# Patient Record
Sex: FEMALE | Race: ASIAN | NOT HISPANIC OR LATINO | Employment: FULL TIME | URBAN - METROPOLITAN AREA
[De-identification: names, ages, dates, MRNs, and addresses within clinical notes are randomized per-mention and may not be internally consistent; named-entity substitution may affect disease eponyms.]

---

## 2024-02-12 DIAGNOSIS — Z79.631 LONG TERM (CURRENT) USE OF ANTIMETABOLITE AGENT: ICD-10-CM

## 2024-02-12 DIAGNOSIS — M05.9 RHEUMATOID ARTHRITIS WITH RHEUMATOID FACTOR, UNSPECIFIED (MULTI): Primary | ICD-10-CM

## 2024-03-01 ENCOUNTER — APPOINTMENT (OUTPATIENT)
Dept: RHEUMATOLOGY | Facility: CLINIC | Age: 64
End: 2024-03-01
Payer: COMMERCIAL

## 2024-03-06 ENCOUNTER — LAB (OUTPATIENT)
Dept: LAB | Facility: LAB | Age: 64
End: 2024-03-06
Payer: COMMERCIAL

## 2024-03-06 ENCOUNTER — OFFICE VISIT (OUTPATIENT)
Dept: RHEUMATOLOGY | Facility: CLINIC | Age: 64
End: 2024-03-06
Payer: COMMERCIAL

## 2024-03-06 VITALS — DIASTOLIC BLOOD PRESSURE: 51 MMHG | HEART RATE: 77 BPM | SYSTOLIC BLOOD PRESSURE: 93 MMHG

## 2024-03-06 DIAGNOSIS — M81.0 OSTEOPOROSIS, UNSPECIFIED OSTEOPOROSIS TYPE, UNSPECIFIED PATHOLOGICAL FRACTURE PRESENCE: Primary | ICD-10-CM

## 2024-03-06 DIAGNOSIS — M05.79 RHEUMATOID ARTHRITIS INVOLVING MULTIPLE SITES WITH POSITIVE RHEUMATOID FACTOR (MULTI): ICD-10-CM

## 2024-03-06 DIAGNOSIS — M81.0 OSTEOPOROSIS, UNSPECIFIED OSTEOPOROSIS TYPE, UNSPECIFIED PATHOLOGICAL FRACTURE PRESENCE: ICD-10-CM

## 2024-03-06 LAB
ALBUMIN SERPL BCP-MCNC: 4.6 G/DL (ref 3.4–5)
ALP SERPL-CCNC: 56 U/L (ref 33–136)
ALT SERPL W P-5'-P-CCNC: 18 U/L (ref 7–45)
ANION GAP SERPL CALC-SCNC: 13 MMOL/L (ref 10–20)
AST SERPL W P-5'-P-CCNC: 23 U/L (ref 9–39)
BASOPHILS # BLD AUTO: 0.03 X10*3/UL (ref 0–0.1)
BASOPHILS NFR BLD AUTO: 0.6 %
BILIRUB SERPL-MCNC: 0.5 MG/DL (ref 0–1.2)
BUN SERPL-MCNC: 19 MG/DL (ref 6–23)
CALCIUM SERPL-MCNC: 9.9 MG/DL (ref 8.6–10.6)
CHLORIDE SERPL-SCNC: 106 MMOL/L (ref 98–107)
CO2 SERPL-SCNC: 30 MMOL/L (ref 21–32)
CREAT SERPL-MCNC: 0.69 MG/DL (ref 0.5–1.05)
EGFRCR SERPLBLD CKD-EPI 2021: >90 ML/MIN/1.73M*2
EOSINOPHIL # BLD AUTO: 0.15 X10*3/UL (ref 0–0.7)
EOSINOPHIL NFR BLD AUTO: 2.9 %
ERYTHROCYTE [DISTWIDTH] IN BLOOD BY AUTOMATED COUNT: 14.6 % (ref 11.5–14.5)
GLUCOSE SERPL-MCNC: 92 MG/DL (ref 74–99)
HCT VFR BLD AUTO: 42.6 % (ref 36–46)
HGB BLD-MCNC: 14 G/DL (ref 12–16)
IMM GRANULOCYTES # BLD AUTO: 0.01 X10*3/UL (ref 0–0.7)
IMM GRANULOCYTES NFR BLD AUTO: 0.2 % (ref 0–0.9)
LYMPHOCYTES # BLD AUTO: 1.73 X10*3/UL (ref 1.2–4.8)
LYMPHOCYTES NFR BLD AUTO: 33 %
MCH RBC QN AUTO: 27.6 PG (ref 26–34)
MCHC RBC AUTO-ENTMCNC: 32.9 G/DL (ref 32–36)
MCV RBC AUTO: 84 FL (ref 80–100)
MONOCYTES # BLD AUTO: 0.38 X10*3/UL (ref 0.1–1)
MONOCYTES NFR BLD AUTO: 7.2 %
NEUTROPHILS # BLD AUTO: 2.95 X10*3/UL (ref 1.2–7.7)
NEUTROPHILS NFR BLD AUTO: 56.1 %
NRBC BLD-RTO: 0 /100 WBCS (ref 0–0)
PLATELET # BLD AUTO: 305 X10*3/UL (ref 150–450)
POTASSIUM SERPL-SCNC: 4.5 MMOL/L (ref 3.5–5.3)
PROT SERPL-MCNC: 7.5 G/DL (ref 6.4–8.2)
RBC # BLD AUTO: 5.08 X10*6/UL (ref 4–5.2)
SODIUM SERPL-SCNC: 144 MMOL/L (ref 136–145)
WBC # BLD AUTO: 5.3 X10*3/UL (ref 4.4–11.3)

## 2024-03-06 PROCEDURE — 36415 COLL VENOUS BLD VENIPUNCTURE: CPT

## 2024-03-06 PROCEDURE — 80053 COMPREHEN METABOLIC PANEL: CPT

## 2024-03-06 PROCEDURE — 99205 OFFICE O/P NEW HI 60 MIN: CPT | Performed by: STUDENT IN AN ORGANIZED HEALTH CARE EDUCATION/TRAINING PROGRAM

## 2024-03-06 PROCEDURE — 85025 COMPLETE CBC W/AUTO DIFF WBC: CPT

## 2024-03-06 RX ORDER — METHOTREXATE 2.5 MG/1
10 TABLET ORAL WEEKLY
COMMUNITY
Start: 2023-11-28 | End: 2024-03-06 | Stop reason: WASHOUT

## 2024-03-06 RX ORDER — ALENDRONATE SODIUM 70 MG/1
70 TABLET ORAL WEEKLY
COMMUNITY
Start: 2023-11-06 | End: 2024-03-06 | Stop reason: SDUPTHER

## 2024-03-06 RX ORDER — FOLIC ACID 1 MG/1
1 TABLET ORAL DAILY
Qty: 90 TABLET | Refills: 3 | Status: SHIPPED | OUTPATIENT
Start: 2024-03-06 | End: 2024-03-11 | Stop reason: SDUPTHER

## 2024-03-06 RX ORDER — ALENDRONATE SODIUM 70 MG/1
TABLET ORAL
Qty: 12 TABLET | Refills: 3 | Status: SHIPPED | OUTPATIENT
Start: 2024-03-06

## 2024-03-06 RX ORDER — FOLIC ACID 1 MG/1
1 TABLET ORAL DAILY
COMMUNITY
End: 2024-03-06 | Stop reason: SDUPTHER

## 2024-03-06 NOTE — PROGRESS NOTES
"Subjective   Patient ID: Tiffanie Tenorio \"Kingsley\" is a 63 y.o. female who presents for Rheumatoid Arthritis (NPV to establish care. No physical complaints or concerns.).  HPI: New patient here for RA and osteoporosis      Reviewed 2023 notes from [facility].  Female with a history of seropositive rheumatoid arthritis (positive RF, positive CCP), erosive disease of left fifth MTP in 2011 without reason x-rays, severe osteoporosis with a striae fractures.  Diagnosed with rheumatoid arthritis in 2008.    Current meds: Methotrexate 10 mg taken by drinking liquid once a week with juice, folic acid,    Osteoporosis history: History of alendronate, history of Forteo, history of Tymlos, started Prolia on February 2022 but the cost was too high for additional doses and then patient was switched to Fosamax again in 2022.    Last bone density July 2022    Lumbar spine L1-L4: T-score -1.3 (statistically significant increase)  Left femoral neck: T-score -4, no statistically significant change    Labs: 11/2023  WBC normal, Hgb normal, platelets normal  Creatinine normal, ALP/AST/ALT normal  Albumin normal  Ca high to 10.3      Review of Systems  Constitutional: No fever or chills  Cardiovascular: no chest pain, no palpitations and no syncope.   Respiratory: no cough, no shortness of breath during exertion and no shortness of breath at rest.   Gastrointestinal: no abdominal pain, no nausea and no vomiting.  Neuro: No Headache, no dizziness    Rheumatology specific review of systems  Denies joint pain, morning stiffness, fevers , chills, unintentional weight loss, rashes, alopecia, mouth sores, nasal ulcers, photosensitivity, Raynauds, morning stiffness in back, dry eyes, dry mouth, blood clots, miscarriages, rheum fam hx    Chronic pain specific review of systems  Denies widespread pain , widespread tenderness, brain fog, depression/anxiety, migraines or tension headaches, IBS or heartburn symptoms, irritable or overactive bladder, " "pelvic pain ,TMJ pain,poor sleep, fatigue    Objective   There were no vitals taken for this visit.      Physical Exam  Constitutional: Alert and in no acute distress. Well developed, well nourished  Head and Face: Head and face: Normal.    Cardiovascular: Heart rate and rhythm were normal, normal S1 and S2. No peripheral edema.   Pulmonary: No respiratory distress. Clear bilateral breath sounds.  Musculoskeletal: No synovitis throughout  Skin: Normal skin color and pigmentation, normal skin turgor, and no rash.    Psychiatric: Judgment and insight: Intact. Mood and affect: Normal.     No results found for: \"WBC\", \"HGB\", \"HCT\", \"PLT\", \"ALT\", \"AST\", \"CREATININE\"       No results found for: \"ANANP\", \"ANATITERADD\", \"ANACO\", \"ANAPATTRN\", \"ANPA2\", \"FANAP\", \"ANATITER\", \"ANAT2\", \"GEM\", \"CDCANA\", \"DSDN\", \"EMPSMRNP\", \"SCLN\", \"SCLABQ\", \"SCIB\", \"CTIB\", \"FXEZAA40\", \"WZVGBH02\", \"ASSB\", \"SSBB\", \"C3\", \"C4\", \"UAMICCOMM\", \"UTPCR\", \"ANTIRIBO\", \"ACEN\", \"SEDRATE\", \"NONUHFIRE\", \"POCESR\", \"CRP\", \"RF\", \"CCPIGGQUAL\"\\            There is currently no information documented on the homunculus. Go to the Rheumatology activity and complete the homunculus joint exam.      Electrocardiogram 12 Lead  Normal sinus rhythm  Low voltage QRS  Borderline ECG  No previous ECGs available  Confirmed by MAYRA SIMPSON MD ([id number]) on 5/3/2016 8:05:02 AM          Assessment/Plan:  #Post-menopausal osteoporosis, multiple fractures, severe T-score, patient states she has had a full workup for osteoporosis including normal thyroid, parathyroid, 24-hour calcium in the urine, may be genetic component; she was adopted  -Fosamax: 2017 October to 2019 October (stopped due to leg cramps)  -Forteo started February 14, 2020, changed to Tymlos February 2021 due to high calcium levels, completed early February 2022  -Single dose of Prolia in February 2022, but additional doses were cost prohibitive  -Fosamax started August 2022-1 year refilled March 2024 and pharmacy " consult to reiterate to patient to take once a week on an empty stomach with a cup of water without laying down or eating for 30 minutes  -Will continue to discuss possible side effects with patient; she is tolerating well  -Remains off calcium due to high serum levels  -On a vitamin D supplement  -Patient likely should have anabolic; will see if we can do Evenity.  Repeat bone density should be done at same location July 2024 (if insurance allows), and based off results, we may see if we can trial Evenity  -Could consider genetic evaluation, but would likely not       #Seropositive RF CCP positive erosive RA  -Was in remission from last note that I could find in May 2023 of [facility] rheumatologist  -Remission again today  -On methotrexate 0.4 mL weekly (10 mg) which she drinks with juice, and folic acid daily-1 year of both refilled March 2024    #Preventative Health Recommendations for Primary Care Providers    Vaccine Recommendations:    From ACR 2022 vaccine recommendations:    For Rheumatic and musculoskeletal disease (RMD) patients aged greater than or equal to 65 years, and RMD patients aged >18 and <65 years who are on immunosuppressive medication, giving high-dose or adjuvanted influenza vaccination is conditionally  recommended over giving regular-dose influenza vaccination.    For patients with RMD aged <65 years who are on immunosuppressive medication, pneumococcal vaccination is strongly recommended.    For patients with RMD aged >18 years who are on immunosuppressive medication, administering the recombinant zoster vaccine is strongly recommended.    For patients with RMD aged >26 and <45 years who are on immunosuppressive medication and not previously vaccinated, vaccination against HPV is conditionally recommended.    Cardiovascular Recommendations:    Patients with inflammatory diseases are at high risk for cardiovascular disease, and should be aggressively screened by primary care  physicians and started on lipid-lowering medications when appropriate.    Bone Health Recommendations:    Patients on steroids should be on calcium and Vitamin D. Patients with inflammatory rheumatic diseases are higher risk for osteoporosis and should have baseline DEXA screening.     Cervical Cancer Screening Recommendations (adapted from ASCCP)  Cytology is recommended if younger than 30 years.  Co-testing is preferred, but cytology is acceptable if 30 years or older.  If using cytology alone, perform annual cervical cytology. If results of 3 consecutive cytology results are normal, perform cytology every 3 years.  If using co-testing, perform baseline co-test with cytology and HPV. If result of cytology is normal and HPV is negative, co-testing can be performed every 3 years.  Continue screening throughout lifetime (older than 65 years). Discontinue screening based on shared discussion regarding quality and duration of life rather than age.      Patient counseled to seek medical care if any new or worsening symptoms, urgently if needed.      Note will be sent to primary care doctor.    Return to clinic in July, labs and dexa before,  sooner if needed    Dragon dictation software was used to dictate this note. Errors may have occurred during dictation that was not intended by the user.

## 2024-03-06 NOTE — LETTER
"March 6, 2024     Low Soliman MD  [address]  [place] [address]    Patient: Kingsley Tenorio   YOB: 1960   Date of Visit: 3/6/2024       Dear Dr. Low Soliman MD:    Thank you for referring Kingsley Tenorio to me for evaluation. Below are my notes for this consultation.  If you have questions, please do not hesitate to call me. I look forward to following your patient along with you.       Sincerely,     Pura Roy MD      CC: No Recipients  ______________________________________________________________________________________    Subjective  Patient ID: Tiffanie Tenorio \"Kingsley\" is a 63 y.o. female who presents for Rheumatoid Arthritis (NPV to establish care. No physical complaints or concerns.).  HPI: New patient here for RA and osteoporosis      Reviewed 2023 notes from [facility].  Female with a history of seropositive rheumatoid arthritis (positive RF, positive CCP), erosive disease of left fifth MTP in 2011 without reason x-rays, severe osteoporosis with a striae fractures.  Diagnosed with rheumatoid arthritis in 2008.    Current meds: Methotrexate 10 mg taken by drinking liquid once a week with juice, folic acid,    Osteoporosis history: History of alendronate, history of Forteo, history of Tymlos, started Prolia on February 2022 but the cost was too high for additional doses and then patient was switched to Fosamax again in 2022.    Last bone density July 2022    Lumbar spine L1-L4: T-score -1.3 (statistically significant increase)  Left femoral neck: T-score -4, no statistically significant change    Labs: 11/2023  WBC normal, Hgb normal, platelets normal  Creatinine normal, ALP/AST/ALT normal  Albumin normal  Ca high to 10.3      Review of Systems  Constitutional: No fever or chills  Cardiovascular: no chest pain, no palpitations and no syncope.   Respiratory: no cough, no shortness of breath during exertion and no shortness of breath at rest.   Gastrointestinal: no abdominal pain, " "no nausea and no vomiting.  Neuro: No Headache, no dizziness    Rheumatology specific review of systems  Denies joint pain, morning stiffness, fevers , chills, unintentional weight loss, rashes, alopecia, mouth sores, nasal ulcers, photosensitivity, Raynauds, morning stiffness in back, dry eyes, dry mouth, blood clots, miscarriages, rheum fam hx    Chronic pain specific review of systems  Denies widespread pain , widespread tenderness, brain fog, depression/anxiety, migraines or tension headaches, IBS or heartburn symptoms, irritable or overactive bladder, pelvic pain ,TMJ pain,poor sleep, fatigue    Objective  There were no vitals taken for this visit.      Physical Exam  Constitutional: Alert and in no acute distress. Well developed, well nourished  Head and Face: Head and face: Normal.    Cardiovascular: Heart rate and rhythm were normal, normal S1 and S2. No peripheral edema.   Pulmonary: No respiratory distress. Clear bilateral breath sounds.  Musculoskeletal: No synovitis throughout  Skin: Normal skin color and pigmentation, normal skin turgor, and no rash.    Psychiatric: Judgment and insight: Intact. Mood and affect: Normal.     No results found for: \"WBC\", \"HGB\", \"HCT\", \"PLT\", \"ALT\", \"AST\", \"CREATININE\"       No results found for: \"ANANP\", \"ANATITERADD\", \"ANACO\", \"ANAPATTRN\", \"ANPA2\", \"FANAP\", \"ANATITER\", \"ANAT2\", \"GEM\", \"CDCANA\", \"DSDN\", \"EMPSMRNP\", \"SCLN\", \"SCLABQ\", \"SCIB\", \"CTIB\", \"RJKDHI91\", \"IANDBU48\", \"ASSB\", \"SSBB\", \"C3\", \"C4\", \"UAMICCOMM\", \"UTPCR\", \"ANTIRIBO\", \"ACEN\", \"SEDRATE\", \"NONUHFIRE\", \"POCESR\", \"CRP\", \"RF\", \"CCPIGGQUAL\"\\            There is currently no information documented on the homunculus. Go to the Rheumatology activity and complete the homunculus joint exam.      Electrocardiogram 12 Lead  Normal sinus rhythm  Low voltage QRS  Borderline ECG  No previous ECGs available  Confirmed by MAYRA SIMPSON MD ([id number]) on 5/3/2016 8:05:02 AM          Assessment/Plan:  #Post-menopausal " osteoporosis, multiple fractures, severe T-score, patient states she has had a full workup for osteoporosis including normal thyroid, parathyroid, 24-hour calcium in the urine, may be genetic component; she was adopted  -Fosamax: 2017 October to 2019 October (stopped due to leg cramps)  -Forteo started February 14, 2020, changed to Tymlos February 2021 due to high calcium levels, completed early February 2022  -Single dose of Prolia in February 2022, but additional doses were cost prohibitive  -Fosamax started August 2022-1 year refilled March 2024 and pharmacy consult to reiterate to patient to take once a week on an empty stomach with a cup of water without laying down or eating for 30 minutes  -Will continue to discuss possible side effects with patient; she is tolerating well  -Remains off calcium due to high serum levels  -On a vitamin D supplement  -Patient likely should have anabolic; will see if we can do Evenity.  Repeat bone density should be done at same location July 2024 (if insurance allows), and based off results, we may see if we can trial Evenity  -Could consider genetic evaluation, but would likely not       #Seropositive RF CCP positive erosive RA  -Was in remission from last note that I could find in May 2023 of [facility] rheumatologist  -Remission again today  -On methotrexate 0.4 mL weekly (10 mg) which she drinks with juice, and folic acid daily-1 year of both refilled March 2024    #Preventative Health Recommendations for Primary Care Providers    Vaccine Recommendations:    From ACR 2022 vaccine recommendations:    For Rheumatic and musculoskeletal disease (RMD) patients aged greater than or equal to 65 years, and RMD patients aged >18 and <65 years who are on immunosuppressive medication, giving high-dose or adjuvanted influenza vaccination is conditionally  recommended over giving regular-dose influenza vaccination.    For patients with RMD aged <65 years who are on  immunosuppressive medication, pneumococcal vaccination is strongly recommended.    For patients with RMD aged >18 years who are on immunosuppressive medication, administering the recombinant zoster vaccine is strongly recommended.    For patients with RMD aged >26 and <45 years who are on immunosuppressive medication and not previously vaccinated, vaccination against HPV is conditionally recommended.    Cardiovascular Recommendations:    Patients with inflammatory diseases are at high risk for cardiovascular disease, and should be aggressively screened by primary care physicians and started on lipid-lowering medications when appropriate.    Bone Health Recommendations:    Patients on steroids should be on calcium and Vitamin D. Patients with inflammatory rheumatic diseases are higher risk for osteoporosis and should have baseline DEXA screening.     Cervical Cancer Screening Recommendations (adapted from ASCCP)  Cytology is recommended if younger than 30 years.  Co-testing is preferred, but cytology is acceptable if 30 years or older.  If using cytology alone, perform annual cervical cytology. If results of 3 consecutive cytology results are normal, perform cytology every 3 years.  If using co-testing, perform baseline co-test with cytology and HPV. If result of cytology is normal and HPV is negative, co-testing can be performed every 3 years.  Continue screening throughout lifetime (older than 65 years). Discontinue screening based on shared discussion regarding quality and duration of life rather than age.      Patient counseled to seek medical care if any new or worsening symptoms, urgently if needed.      Note will be sent to primary care doctor.    Return to clinic in July, labs and dexa before,  sooner if needed    Dragon dictation software was used to dictate this note. Errors may have occurred during dictation that was not intended by the user.

## 2024-03-11 DIAGNOSIS — M05.79 RHEUMATOID ARTHRITIS INVOLVING MULTIPLE SITES WITH POSITIVE RHEUMATOID FACTOR (MULTI): ICD-10-CM

## 2024-03-11 RX ORDER — FOLIC ACID 1 MG/1
1 TABLET ORAL DAILY
Qty: 90 TABLET | Refills: 3 | Status: SHIPPED | OUTPATIENT
Start: 2024-03-11 | End: 2024-05-01 | Stop reason: SDUPTHER

## 2024-04-25 ENCOUNTER — HOSPITAL ENCOUNTER (OUTPATIENT)
Dept: RADIOLOGY | Facility: CLINIC | Age: 64
End: 2024-04-25
Payer: COMMERCIAL

## 2024-04-30 ENCOUNTER — OFFICE VISIT (OUTPATIENT)
Dept: PRIMARY CARE | Facility: CLINIC | Age: 64
End: 2024-04-30

## 2024-04-30 VITALS — SYSTOLIC BLOOD PRESSURE: 96 MMHG | BODY MASS INDEX: 16.1 KG/M2 | DIASTOLIC BLOOD PRESSURE: 52 MMHG | WEIGHT: 88 LBS

## 2024-04-30 DIAGNOSIS — Z00.00 HEALTH MAINTENANCE EXAMINATION: ICD-10-CM

## 2024-04-30 DIAGNOSIS — E78.00 HYPERCHOLESTEROLEMIA: Primary | ICD-10-CM

## 2024-04-30 DIAGNOSIS — M05.9 SEROPOSITIVE RHEUMATOID ARTHRITIS (MULTI): ICD-10-CM

## 2024-04-30 DIAGNOSIS — M81.0 AGE-RELATED OSTEOPOROSIS WITHOUT CURRENT PATHOLOGICAL FRACTURE: ICD-10-CM

## 2024-04-30 PROBLEM — F41.8 DEPRESSION WITH ANXIETY: Status: ACTIVE | Noted: 2017-01-11

## 2024-04-30 PROBLEM — R10.9 ABDOMINAL PAIN: Status: ACTIVE | Noted: 2024-04-30

## 2024-04-30 PROBLEM — R31.9 HEMATURIA: Status: ACTIVE | Noted: 2024-04-30

## 2024-04-30 PROBLEM — S62.101A RIGHT WRIST FRACTURE: Status: ACTIVE | Noted: 2024-04-30

## 2024-04-30 PROBLEM — S69.90XA INJURY OF WRIST: Status: ACTIVE | Noted: 2024-04-30

## 2024-04-30 PROBLEM — Z86.59 HISTORY OF DEPRESSION: Status: ACTIVE | Noted: 2024-04-30

## 2024-04-30 PROBLEM — M79.605 LEFT LEG PAIN: Status: ACTIVE | Noted: 2024-04-30

## 2024-04-30 PROBLEM — S43.005A DISLOCATION OF LEFT SHOULDER JOINT: Status: ACTIVE | Noted: 2024-04-30

## 2024-04-30 PROBLEM — S82.143A TIBIAL PLATEAU FRACTURE: Status: ACTIVE | Noted: 2024-04-30

## 2024-04-30 PROBLEM — S42.142A: Status: ACTIVE | Noted: 2024-04-30

## 2024-04-30 PROBLEM — R10.2 PELVIC PAIN: Status: ACTIVE | Noted: 2024-04-30

## 2024-04-30 PROBLEM — R10.2 PAIN IN PELVIS: Status: ACTIVE | Noted: 2024-04-30

## 2024-04-30 PROBLEM — M25.512 SHOULDER PAIN, LEFT: Status: ACTIVE | Noted: 2024-04-30

## 2024-04-30 PROBLEM — M25.531 RIGHT WRIST PAIN: Status: ACTIVE | Noted: 2024-04-30

## 2024-04-30 PROBLEM — S43.032A: Status: ACTIVE | Noted: 2024-04-30

## 2024-04-30 PROBLEM — M79.602 LEFT ARM PAIN: Status: ACTIVE | Noted: 2019-03-08

## 2024-04-30 PROBLEM — S52.022A CLOSED FRACTURE OF LEFT OLECRANON PROCESS: Status: ACTIVE | Noted: 2019-02-01

## 2024-04-30 PROCEDURE — 99203 OFFICE O/P NEW LOW 30 MIN: CPT | Performed by: INTERNAL MEDICINE

## 2024-04-30 RX ORDER — ATORVASTATIN CALCIUM 10 MG/1
10 TABLET, FILM COATED ORAL DAILY
COMMUNITY

## 2024-04-30 RX ORDER — FLUOXETINE 20 MG/1
20 TABLET ORAL DAILY
COMMUNITY

## 2024-04-30 NOTE — PROGRESS NOTES
Subjective   Patient ID: Kingsley Tenorio is a 64 y.o. female who presents for New Patient Visit.    HPI   To establish PCP  Her insurance has switched and she cannot see [facility] doctors now  She is here to establish and follow-up on high cholesterol and rheumatoid arthritis and osteoporosis  She is due for physical August.  Her last blood work was in August  She lost her  last week he was 82 years old  for 24 years    Past medical history: Osteoporosis high cholesterol rheumatoid arthritis anxiety depression cochlear implant on the right side  Occupation:   Social history: Vapes quit smoking note drugs no drinking  Family history: Adopted.  Patient was born in [place] and adopted by Citizen of [place] couple when she was few years old    Review of Systems    Objective   BP 96/52   Wt (!) 39.9 kg (88 lb)   BMI 16.10 kg/m²     Physical Exam  Vitals reviewed.   Constitutional:       Appearance: Normal appearance.   HENT:      Head: Normocephalic and atraumatic.      Right Ear: Tympanic membrane, ear canal and external ear normal.      Left Ear: Tympanic membrane, ear canal and external ear normal.      Nose: Nose normal.      Mouth/Throat:      Pharynx: Oropharynx is clear.   Eyes:      Extraocular Movements: Extraocular movements intact.      Conjunctiva/sclera: Conjunctivae normal.      Pupils: Pupils are equal, round, and reactive to light.   Cardiovascular:      Rate and Rhythm: Normal rate and regular rhythm.      Pulses: Normal pulses.      Heart sounds: Normal heart sounds.   Pulmonary:      Effort: Pulmonary effort is normal.      Breath sounds: Normal breath sounds.   Abdominal:      General: Abdomen is flat. Bowel sounds are normal.      Palpations: Abdomen is soft.   Musculoskeletal:      Cervical back: Normal range of motion and neck supple.   Skin:     General: Skin is warm and dry.   Neurological:      General: No focal deficit present.      Mental Status: She is alert  and oriented to person, place, and time.   Psychiatric:         Mood and Affect: Mood normal.       Assessment/Plan   Problem List Items Addressed This Visit             ICD-10-CM       Endocrine/Metabolic    Osteoporosis M81.0       Multi-system (Lupus, Sarcoid)    Seropositive rheumatoid arthritis (Multi) M05.9     Other Visit Diagnoses         Codes    Hypercholesterolemia    -  Primary E78.00    Health maintenance examination     Z00.00    Relevant Orders    CT cardiac scoring wo IV contrast        Patient's old chart reviewed  She is due for physical in August  CT cardiac scoring ordered to assess risk factors  Encouraged to quit vaping  Medications reviewed and prescriptions continue

## 2024-05-01 DIAGNOSIS — M05.79 RHEUMATOID ARTHRITIS INVOLVING MULTIPLE SITES WITH POSITIVE RHEUMATOID FACTOR (MULTI): ICD-10-CM

## 2024-05-01 RX ORDER — METHOTREXATE 2.5 MG/1
TABLET ORAL
Qty: 48 TABLET | Refills: 3 | Status: SHIPPED | OUTPATIENT
Start: 2024-05-05

## 2024-05-01 RX ORDER — FOLIC ACID 1 MG/1
1 TABLET ORAL DAILY
Qty: 90 TABLET | Refills: 3 | Status: SHIPPED | OUTPATIENT
Start: 2024-05-01

## 2024-05-29 ENCOUNTER — OFFICE VISIT (OUTPATIENT)
Dept: RHEUMATOLOGY | Facility: CLINIC | Age: 64
End: 2024-05-29

## 2024-05-29 ENCOUNTER — LAB (OUTPATIENT)
Dept: LAB | Facility: LAB | Age: 64
End: 2024-05-29
Payer: COMMERCIAL

## 2024-05-29 VITALS — DIASTOLIC BLOOD PRESSURE: 76 MMHG | WEIGHT: 86.4 LBS | SYSTOLIC BLOOD PRESSURE: 118 MMHG | BODY MASS INDEX: 15.8 KG/M2

## 2024-05-29 DIAGNOSIS — R79.9 ABNORMAL BLOOD CHEMISTRY: ICD-10-CM

## 2024-05-29 DIAGNOSIS — M05.79 RHEUMATOID ARTHRITIS INVOLVING MULTIPLE SITES WITH POSITIVE RHEUMATOID FACTOR (MULTI): Primary | ICD-10-CM

## 2024-05-29 DIAGNOSIS — M81.0 OSTEOPOROSIS, UNSPECIFIED OSTEOPOROSIS TYPE, UNSPECIFIED PATHOLOGICAL FRACTURE PRESENCE: ICD-10-CM

## 2024-05-29 DIAGNOSIS — M05.79 RHEUMATOID ARTHRITIS INVOLVING MULTIPLE SITES WITH POSITIVE RHEUMATOID FACTOR (MULTI): ICD-10-CM

## 2024-05-29 LAB
25(OH)D3 SERPL-MCNC: 39 NG/ML (ref 30–100)
ALBUMIN SERPL BCP-MCNC: 4.4 G/DL (ref 3.4–5)
ALP SERPL-CCNC: 46 U/L (ref 33–136)
ALT SERPL W P-5'-P-CCNC: 12 U/L (ref 7–45)
AST SERPL W P-5'-P-CCNC: 22 U/L (ref 9–39)
BASOPHILS # BLD AUTO: 0.03 X10*3/UL (ref 0–0.1)
BASOPHILS NFR BLD AUTO: 0.9 %
BILIRUB DIRECT SERPL-MCNC: 0.2 MG/DL (ref 0–0.3)
BILIRUB SERPL-MCNC: 0.8 MG/DL (ref 0–1.2)
CREAT SERPL-MCNC: 0.66 MG/DL (ref 0.5–1.05)
CRP SERPL-MCNC: 0.74 MG/DL
EGFRCR SERPLBLD CKD-EPI 2021: >90 ML/MIN/1.73M*2
EOSINOPHIL # BLD AUTO: 0.14 X10*3/UL (ref 0–0.7)
EOSINOPHIL NFR BLD AUTO: 4.2 %
ERYTHROCYTE [DISTWIDTH] IN BLOOD BY AUTOMATED COUNT: 14.6 % (ref 11.5–14.5)
HCT VFR BLD AUTO: 39.6 % (ref 36–46)
HGB BLD-MCNC: 12.8 G/DL (ref 12–16)
IMM GRANULOCYTES # BLD AUTO: 0.01 X10*3/UL (ref 0–0.7)
IMM GRANULOCYTES NFR BLD AUTO: 0.3 % (ref 0–0.9)
LYMPHOCYTES # BLD AUTO: 1.02 X10*3/UL (ref 1.2–4.8)
LYMPHOCYTES NFR BLD AUTO: 30.7 %
MCH RBC QN AUTO: 27 PG (ref 26–34)
MCHC RBC AUTO-ENTMCNC: 32.3 G/DL (ref 32–36)
MCV RBC AUTO: 84 FL (ref 80–100)
MONOCYTES # BLD AUTO: 0.35 X10*3/UL (ref 0.1–1)
MONOCYTES NFR BLD AUTO: 10.5 %
NEUTROPHILS # BLD AUTO: 1.77 X10*3/UL (ref 1.2–7.7)
NEUTROPHILS NFR BLD AUTO: 53.4 %
NRBC BLD-RTO: 0 /100 WBCS (ref 0–0)
PLATELET # BLD AUTO: 272 X10*3/UL (ref 150–450)
PROT SERPL-MCNC: 6.9 G/DL (ref 6.4–8.2)
RBC # BLD AUTO: 4.74 X10*6/UL (ref 4–5.2)
WBC # BLD AUTO: 3.3 X10*3/UL (ref 4.4–11.3)

## 2024-05-29 PROCEDURE — 86140 C-REACTIVE PROTEIN: CPT

## 2024-05-29 PROCEDURE — 82306 VITAMIN D 25 HYDROXY: CPT

## 2024-05-29 PROCEDURE — 99214 OFFICE O/P EST MOD 30 MIN: CPT | Performed by: INTERNAL MEDICINE

## 2024-05-29 PROCEDURE — 99204 OFFICE O/P NEW MOD 45 MIN: CPT | Performed by: INTERNAL MEDICINE

## 2024-05-29 PROCEDURE — 85025 COMPLETE CBC W/AUTO DIFF WBC: CPT

## 2024-05-29 PROCEDURE — 82565 ASSAY OF CREATININE: CPT

## 2024-05-29 PROCEDURE — 80076 HEPATIC FUNCTION PANEL: CPT
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
## 2024-06-03 ENCOUNTER — LAB (OUTPATIENT)
Dept: LAB | Facility: LAB | Age: 64
End: 2024-06-03

## 2024-06-03 DIAGNOSIS — R79.9 ABNORMAL BLOOD CHEMISTRY: ICD-10-CM

## 2024-06-03 LAB
BASOPHILS # BLD AUTO: 0.03 X10*3/UL (ref 0–0.1)
BASOPHILS NFR BLD AUTO: 0.3 %
EOSINOPHIL # BLD AUTO: 0.14 X10*3/UL (ref 0–0.7)
EOSINOPHIL NFR BLD AUTO: 1.4 %
ERYTHROCYTE [DISTWIDTH] IN BLOOD BY AUTOMATED COUNT: 14.8 % (ref 11.5–14.5)
HCT VFR BLD AUTO: 42.1 % (ref 36–46)
HGB BLD-MCNC: 13.1 G/DL (ref 12–16)
IMM GRANULOCYTES # BLD AUTO: 0.03 X10*3/UL (ref 0–0.7)
IMM GRANULOCYTES NFR BLD AUTO: 0.3 % (ref 0–0.9)
LYMPHOCYTES # BLD AUTO: 0.97 X10*3/UL (ref 1.2–4.8)
LYMPHOCYTES NFR BLD AUTO: 9.6 %
MCH RBC QN AUTO: 27.1 PG (ref 26–34)
MCHC RBC AUTO-ENTMCNC: 31.1 G/DL (ref 32–36)
MCV RBC AUTO: 87 FL (ref 80–100)
MONOCYTES # BLD AUTO: 0.58 X10*3/UL (ref 0.1–1)
MONOCYTES NFR BLD AUTO: 5.8 %
NEUTROPHILS # BLD AUTO: 8.31 X10*3/UL (ref 1.2–7.7)
NEUTROPHILS NFR BLD AUTO: 82.6 %
NRBC BLD-RTO: 0 /100 WBCS (ref 0–0)
PLATELET # BLD AUTO: 269 X10*3/UL (ref 150–450)
RBC # BLD AUTO: 4.83 X10*6/UL (ref 4–5.2)
WBC # BLD AUTO: 10.1 X10*3/UL (ref 4.4–11.3)

## 2024-06-03 PROCEDURE — 36415 COLL VENOUS BLD VENIPUNCTURE: CPT

## 2024-06-03 PROCEDURE — 85025 COMPLETE CBC W/AUTO DIFF WBC: CPT

## 2024-07-02 ENCOUNTER — APPOINTMENT (OUTPATIENT)
Dept: RADIOLOGY | Facility: CLINIC | Age: 64
End: 2024-07-02

## 2024-07-22 ENCOUNTER — HOSPITAL ENCOUNTER (OUTPATIENT)
Dept: RADIOLOGY | Facility: HOSPITAL | Age: 64
Discharge: HOME | End: 2024-07-22

## 2024-07-22 DIAGNOSIS — M81.0 OSTEOPOROSIS, UNSPECIFIED OSTEOPOROSIS TYPE, UNSPECIFIED PATHOLOGICAL FRACTURE PRESENCE: ICD-10-CM

## 2024-07-22 PROCEDURE — 77080 DXA BONE DENSITY AXIAL: CPT

## 2024-07-22 ASSESSMENT — LIFESTYLE VARIABLES
CURRENT_SMOKER: Y
3_OR_MORE_DRINKS_PER_DAY: N

## 2024-07-29 ENCOUNTER — OFFICE VISIT (OUTPATIENT)
Dept: PRIMARY CARE | Facility: CLINIC | Age: 64
End: 2024-07-29
Payer: COMMERCIAL

## 2024-07-29 ENCOUNTER — PATIENT MESSAGE (OUTPATIENT)
Dept: PRIMARY CARE | Facility: CLINIC | Age: 64
End: 2024-07-29

## 2024-07-29 VITALS
SYSTOLIC BLOOD PRESSURE: 102 MMHG | WEIGHT: 81.2 LBS | OXYGEN SATURATION: 97 % | TEMPERATURE: 96.8 F | HEIGHT: 62 IN | DIASTOLIC BLOOD PRESSURE: 62 MMHG | RESPIRATION RATE: 18 BRPM | BODY MASS INDEX: 14.94 KG/M2 | HEART RATE: 75 BPM

## 2024-07-29 DIAGNOSIS — M81.0 OSTEOPOROSIS, UNSPECIFIED OSTEOPOROSIS TYPE, UNSPECIFIED PATHOLOGICAL FRACTURE PRESENCE: ICD-10-CM

## 2024-07-29 DIAGNOSIS — Z86.59 HISTORY OF DEPRESSION: ICD-10-CM

## 2024-07-29 DIAGNOSIS — R25.2 MUSCLE CRAMPS: Primary | ICD-10-CM

## 2024-07-29 PROCEDURE — 1036F TOBACCO NON-USER: CPT | Performed by: FAMILY MEDICINE

## 2024-07-29 PROCEDURE — 99203 OFFICE O/P NEW LOW 30 MIN: CPT | Performed by: FAMILY MEDICINE

## 2024-07-29 PROCEDURE — 3008F BODY MASS INDEX DOCD: CPT | Performed by: FAMILY MEDICINE

## 2024-07-29 RX ORDER — FLUOXETINE 20 MG/1
20 TABLET ORAL DAILY
Qty: 30 TABLET | Refills: 11 | Status: SHIPPED | OUTPATIENT
Start: 2024-07-29 | End: 2025-07-29

## 2024-07-29 ASSESSMENT — PATIENT HEALTH QUESTIONNAIRE - PHQ9
1. LITTLE INTEREST OR PLEASURE IN DOING THINGS: NOT AT ALL
2. FEELING DOWN, DEPRESSED OR HOPELESS: NOT AT ALL
SUM OF ALL RESPONSES TO PHQ9 QUESTIONS 1 AND 2: 0

## 2024-07-29 ASSESSMENT — ENCOUNTER SYMPTOMS
DEPRESSION: 0
LOSS OF SENSATION IN FEET: 0
OCCASIONAL FEELINGS OF UNSTEADINESS: 0

## 2024-07-29 ASSESSMENT — PAIN SCALES - GENERAL: PAINLEVEL: 0-NO PAIN

## 2024-07-29 NOTE — PROGRESS NOTES
"Subjective   Patient ID: Kingsley Tenorio is a 64 y.o. female who presents for Leg Cramps.    HPI pt c/o foot and ankle cramps     Review of Systems    Objective   /62   Pulse 75   Temp 36 °C (96.8 °F)   Resp 18   Ht 1.575 m (5' 2\")   Wt (!) 36.8 kg (81 lb 3.2 oz)   SpO2 97%   BMI 14.85 kg/m²     Physical Exam    Assessment/Plan          "

## 2024-07-29 NOTE — PROGRESS NOTES
"Subjective   Patient ID: Kingsley Tenorio is a 64 y.o. female who presents for Leg Cramps.    HPI can't take quinine dt cochlear implant    Review of Systems    Objective   /62   Pulse 75   Temp 36 °C (96.8 °F)   Resp 18   Ht 1.575 m (5' 2\")   Wt (!) 36.8 kg (81 lb 3.2 oz)   SpO2 97%   BMI 14.85 kg/m²     Physical Exam  Constitutional:       General: She is not in acute distress.     Appearance: Normal appearance.   Cardiovascular:      Rate and Rhythm: Normal rate and regular rhythm.      Heart sounds: No murmur heard.  Pulmonary:      Breath sounds: Normal breath sounds. No wheezing.   Neurological:      Mental Status: She is alert.         Assessment/Plan   Problem List Items Addressed This Visit             ICD-10-CM    History of depression Z86.59    Relevant Medications    FLUoxetine (PROzac) 20 mg tablet     Other Visit Diagnoses         Codes    Muscle cramps    -  Primary R25.2    Relevant Orders    CBC and Auto Differential    Comprehensive Metabolic Panel    Iron and TIBC               "

## 2024-07-30 ENCOUNTER — PATIENT MESSAGE (OUTPATIENT)
Dept: PRIMARY CARE | Facility: CLINIC | Age: 64
End: 2024-07-30

## 2024-07-30 DIAGNOSIS — Z12.31 BREAST CANCER SCREENING BY MAMMOGRAM: ICD-10-CM

## 2024-07-30 DIAGNOSIS — M05.79 RHEUMATOID ARTHRITIS INVOLVING MULTIPLE SITES WITH POSITIVE RHEUMATOID FACTOR (MULTI): ICD-10-CM

## 2024-07-30 RX ORDER — ALENDRONATE SODIUM 70 MG/1
TABLET ORAL
Qty: 12 TABLET | Refills: 3 | Status: SHIPPED | OUTPATIENT
Start: 2024-07-30

## 2024-07-30 RX ORDER — METHOTREXATE 2.5 MG/1
TABLET ORAL
Qty: 48 TABLET | Refills: 2 | Status: SHIPPED | OUTPATIENT
Start: 2024-07-30

## 2024-08-07 ENCOUNTER — APPOINTMENT (OUTPATIENT)
Dept: RADIOLOGY | Facility: HOSPITAL | Age: 64
End: 2024-08-07

## 2024-08-20 ENCOUNTER — HOSPITAL ENCOUNTER (OUTPATIENT)
Dept: RADIOLOGY | Facility: HOSPITAL | Age: 64
Discharge: HOME | End: 2024-08-20

## 2024-08-20 VITALS — HEIGHT: 62 IN | WEIGHT: 80 LBS | BODY MASS INDEX: 14.72 KG/M2

## 2024-08-20 DIAGNOSIS — Z12.31 BREAST CANCER SCREENING BY MAMMOGRAM: ICD-10-CM

## 2024-08-20 PROCEDURE — 77067 SCR MAMMO BI INCL CAD: CPT

## 2024-08-20 PROCEDURE — 77063 BREAST TOMOSYNTHESIS BI: CPT | Performed by: STUDENT IN AN ORGANIZED HEALTH CARE EDUCATION/TRAINING PROGRAM

## 2024-08-20 PROCEDURE — 77067 SCR MAMMO BI INCL CAD: CPT | Performed by: STUDENT IN AN ORGANIZED HEALTH CARE EDUCATION/TRAINING PROGRAM

## 2024-08-21 ENCOUNTER — HOSPITAL ENCOUNTER (OUTPATIENT)
Dept: RADIOLOGY | Facility: EXTERNAL LOCATION | Age: 64
Discharge: HOME | End: 2024-08-21

## 2024-08-21 DIAGNOSIS — R92.8 OTHER ABNORMAL AND INCONCLUSIVE FINDINGS ON DIAGNOSTIC IMAGING OF BREAST: ICD-10-CM

## 2024-08-21 DIAGNOSIS — Z12.31 ENCOUNTER FOR SCREENING MAMMOGRAM FOR MALIGNANT NEOPLASM OF BREAST: ICD-10-CM

## 2024-08-30 ENCOUNTER — APPOINTMENT (OUTPATIENT)
Dept: PRIMARY CARE | Facility: CLINIC | Age: 64
End: 2024-08-30

## 2024-09-06 ENCOUNTER — OFFICE VISIT (OUTPATIENT)
Dept: RHEUMATOLOGY | Facility: CLINIC | Age: 64
End: 2024-09-06

## 2024-09-06 VITALS — WEIGHT: 82.4 LBS | BODY MASS INDEX: 15.07 KG/M2 | SYSTOLIC BLOOD PRESSURE: 104 MMHG | DIASTOLIC BLOOD PRESSURE: 62 MMHG

## 2024-09-06 DIAGNOSIS — M81.0 OSTEOPOROSIS, UNSPECIFIED OSTEOPOROSIS TYPE, UNSPECIFIED PATHOLOGICAL FRACTURE PRESENCE: ICD-10-CM

## 2024-09-06 DIAGNOSIS — M05.79 RHEUMATOID ARTHRITIS INVOLVING MULTIPLE SITES WITH POSITIVE RHEUMATOID FACTOR (MULTI): Primary | ICD-10-CM

## 2024-09-06 PROCEDURE — 99214 OFFICE O/P EST MOD 30 MIN: CPT | Performed by: INTERNAL MEDICINE

## 2024-09-06 NOTE — PROGRESS NOTES
Recheck  RA  /  OP  ( Alendronate )   doing well     HPI - She is doing well - no partic pain other than L knee since she had a fx.  No AM stiffness.   +gelling.   No CP, SOB, or GI.  Recent dry cough.      PE  NAD  RRR no r/m/g  CTA  No edema  No synovitis  NT and no pain with ROM throughout    A/P - RA, doing well  OP - on alendronate after anabolics - will check DEXA approx 6/26 for poss drug holiday  Reviewed prev labs  Check labs  Follow up 3 mo

## 2024-09-16 ENCOUNTER — PATIENT MESSAGE (OUTPATIENT)
Dept: PRIMARY CARE | Facility: CLINIC | Age: 64
End: 2024-09-16

## 2024-09-16 DIAGNOSIS — R10.9 ABDOMINAL PAIN, UNSPECIFIED ABDOMINAL LOCATION: ICD-10-CM

## 2024-09-16 DIAGNOSIS — Z12.11 SCREENING FOR COLON CANCER: ICD-10-CM

## 2024-10-24 ENCOUNTER — PATIENT MESSAGE (OUTPATIENT)
Dept: PRIMARY CARE | Facility: CLINIC | Age: 64
End: 2024-10-24

## 2024-10-24 ENCOUNTER — TELEMEDICINE (OUTPATIENT)
Dept: PRIMARY CARE | Facility: CLINIC | Age: 64
End: 2024-10-24

## 2024-10-24 DIAGNOSIS — U07.1 COVID-19: Primary | ICD-10-CM

## 2024-10-24 DIAGNOSIS — E78.5 HYPERLIPIDEMIA, UNSPECIFIED HYPERLIPIDEMIA TYPE: ICD-10-CM

## 2024-10-24 PROCEDURE — 99213 OFFICE O/P EST LOW 20 MIN: CPT | Performed by: FAMILY MEDICINE

## 2024-10-24 RX ORDER — BENZONATATE 200 MG/1
200 CAPSULE ORAL 3 TIMES DAILY PRN
Qty: 42 CAPSULE | Refills: 3 | Status: SHIPPED | OUTPATIENT
Start: 2024-10-24 | End: 2025-04-22

## 2024-10-24 NOTE — LETTER
October 24, 2024     Patient: Tiffanie Tenorio   YOB: 1960   Date of Visit: 10/24/2024       To Whom It May Concern:    Tiffanie Tenorio was seen in my clinic on 10/24/2024 at 9:00 am. Please excuse Tiffanie for her absence from work on this day to make the appointment.  She may return to work on 10/29/24.    If you have any questions or concerns, please don't hesitate to call.         Sincerely,         Gregorio Andrew, DO        CC: No Recipients

## 2024-10-24 NOTE — PROGRESS NOTES
Subjective   Patient ID: Kingsley Tenorio is a 64 y.o. female who presents for No chief complaint on file..    HPI for 5 days she has had cough and congestion.  No fever or sob.  No abdominal pain or diarrhea.  Covid positive yesterday.  She talks on the phone all day and is having trouble due to cough.     Review of Systems see HPI    Objective   There were no vitals taken for this visit.    Physical Exam    Assessment/Plan   Problem List Items Addressed This Visit    None  Visit Diagnoses         Codes    COVID-19    -  Primary U07.1    Relevant Medications    benzonatate (Tessalon) 200 mg capsule        This visit was performed in using real-time telehealth tools, including an audio/visual connection between Tiffanie Tenorio and their home and Gregorio Andrew DO, with [facility].  Verbal consent was obtained from the patient to initiate this visit.   She is out of the window for Paxlovid use and appears to be doing well.  Will continue symptomatic care and follow up if any increased symptoms.

## 2024-10-25 ENCOUNTER — PATIENT MESSAGE (OUTPATIENT)
Dept: PRIMARY CARE | Facility: CLINIC | Age: 64
End: 2024-10-25
Payer: COMMERCIAL

## 2024-10-25 DIAGNOSIS — E78.5 HYPERLIPIDEMIA, UNSPECIFIED HYPERLIPIDEMIA TYPE: ICD-10-CM

## 2024-10-25 RX ORDER — ATORVASTATIN CALCIUM 10 MG/1
10 TABLET, FILM COATED ORAL DAILY
Qty: 30 TABLET | Refills: 11 | Status: SHIPPED | OUTPATIENT
Start: 2024-10-25 | End: 2025-10-25

## 2024-10-29 RX ORDER — ATORVASTATIN CALCIUM 10 MG/1
10 TABLET, FILM COATED ORAL DAILY
Qty: 90 TABLET | Refills: 1 | OUTPATIENT
Start: 2024-10-29 | End: 2025-10-29

## 2024-12-09 ENCOUNTER — OFFICE VISIT (OUTPATIENT)
Dept: RHEUMATOLOGY | Facility: CLINIC | Age: 64
End: 2024-12-09
Payer: COMMERCIAL

## 2024-12-09 VITALS — BODY MASS INDEX: 15.91 KG/M2 | SYSTOLIC BLOOD PRESSURE: 108 MMHG | DIASTOLIC BLOOD PRESSURE: 68 MMHG | WEIGHT: 87 LBS

## 2024-12-09 DIAGNOSIS — M81.0 OSTEOPOROSIS, UNSPECIFIED OSTEOPOROSIS TYPE, UNSPECIFIED PATHOLOGICAL FRACTURE PRESENCE: ICD-10-CM

## 2024-12-09 DIAGNOSIS — M05.79 RHEUMATOID ARTHRITIS INVOLVING MULTIPLE SITES WITH POSITIVE RHEUMATOID FACTOR (MULTI): Primary | ICD-10-CM

## 2024-12-09 DIAGNOSIS — M15.9 OSTEOARTHRITIS OF MULTIPLE JOINTS, UNSPECIFIED OSTEOARTHRITIS TYPE: ICD-10-CM

## 2024-12-09 PROCEDURE — 99214 OFFICE O/P EST MOD 30 MIN: CPT | Performed by: INTERNAL MEDICINE

## 2024-12-09 RX ORDER — ALENDRONATE SODIUM 70 MG/1
TABLET ORAL
Qty: 12 TABLET | Refills: 1 | Status: SHIPPED | OUTPATIENT
Start: 2024-12-09

## 2024-12-09 RX ORDER — METHOTREXATE 2.5 MG/1
TABLET ORAL
Qty: 48 TABLET | Refills: 1 | Status: SHIPPED | OUTPATIENT
Start: 2024-12-09

## 2024-12-09 NOTE — PROGRESS NOTES
Recheck  RA  /  OP ( Alendronate ) increased left index finger pain and stiffness AM with resolution approx 3 hours.  '    Her hands bother her in the morning - she has been packing boxes to move, and she wonders if this is causing it.  No other pain.  Hands sl swollen in AM- sometimes has trouble with rings.  AM stiffness can last all AM.  No CP, SOB, or GI.  Some cough thinks d/t vaping    PE  NAD  RRR no r/m/g  CTA  No edema  No synovitis.  +heberdons  NT and no pain with ROM throughout    A/P - OA and RA - She said she doesn't take anything for the hands because she doesn't have pain - just they are uncomfortable  OP - on alendronate after anabolics.  Check DEXA approx 6/26 for poss drug holiday  Reviewed prev labs - mild leukopenia (scanned)  [facility]  Follow up 3 mo

## 2025-01-12 ENCOUNTER — PATIENT MESSAGE (OUTPATIENT)
Dept: PRIMARY CARE | Facility: CLINIC | Age: 65
End: 2025-01-12
Payer: COMMERCIAL

## 2025-01-12 DIAGNOSIS — M05.9 SEROPOSITIVE RHEUMATOID ARTHRITIS: ICD-10-CM

## 2025-01-13 DIAGNOSIS — M81.0 OSTEOPOROSIS, UNSPECIFIED OSTEOPOROSIS TYPE, UNSPECIFIED PATHOLOGICAL FRACTURE PRESENCE: ICD-10-CM

## 2025-01-13 DIAGNOSIS — M05.79 RHEUMATOID ARTHRITIS INVOLVING MULTIPLE SITES WITH POSITIVE RHEUMATOID FACTOR (MULTI): ICD-10-CM

## 2025-01-13 DIAGNOSIS — Z12.11 SCREENING FOR COLON CANCER: ICD-10-CM

## 2025-01-13 DIAGNOSIS — E78.5 HYPERLIPIDEMIA, UNSPECIFIED HYPERLIPIDEMIA TYPE: ICD-10-CM

## 2025-01-13 RX ORDER — ALENDRONATE SODIUM 70 MG/1
TABLET ORAL
Qty: 12 TABLET | Refills: 1 | Status: SHIPPED | OUTPATIENT
Start: 2025-01-13

## 2025-01-13 RX ORDER — ATORVASTATIN CALCIUM 10 MG/1
10 TABLET, FILM COATED ORAL DAILY
Qty: 30 TABLET | Refills: 11 | Status: SHIPPED | OUTPATIENT
Start: 2025-01-13 | End: 2026-01-13

## 2025-01-13 RX ORDER — ALENDRONATE SODIUM 70 MG/1
TABLET ORAL
Qty: 12 TABLET | Refills: 1 | Status: CANCELLED | OUTPATIENT
Start: 2025-01-13

## 2025-01-13 RX ORDER — METHOTREXATE 2.5 MG/1
TABLET ORAL
Qty: 48 TABLET | Refills: 0 | Status: SHIPPED | OUTPATIENT
Start: 2025-01-13

## 2025-04-24 DIAGNOSIS — M05.9 SEROPOSITIVE RHEUMATOID ARTHRITIS: ICD-10-CM

## 2025-04-24 DIAGNOSIS — E78.5 HYPERLIPIDEMIA, UNSPECIFIED HYPERLIPIDEMIA TYPE: ICD-10-CM

## 2025-04-24 RX ORDER — ATORVASTATIN CALCIUM 10 MG/1
10 TABLET, FILM COATED ORAL DAILY
Qty: 30 TABLET | Refills: 3 | Status: SHIPPED | OUTPATIENT
Start: 2025-04-24 | End: 2026-04-24

## 2025-04-25 DIAGNOSIS — M81.0 OSTEOPOROSIS, UNSPECIFIED OSTEOPOROSIS TYPE, UNSPECIFIED PATHOLOGICAL FRACTURE PRESENCE: ICD-10-CM

## 2025-04-25 DIAGNOSIS — M05.79 RHEUMATOID ARTHRITIS INVOLVING MULTIPLE SITES WITH POSITIVE RHEUMATOID FACTOR (MULTI): ICD-10-CM

## 2025-04-25 RX ORDER — ALENDRONATE SODIUM 70 MG/1
TABLET ORAL
Qty: 12 TABLET | Refills: 0 | Status: SHIPPED | OUTPATIENT
Start: 2025-04-25

## 2025-04-25 RX ORDER — METHOTREXATE 2.5 MG/1
TABLET ORAL
Qty: 48 TABLET | Refills: 0 | Status: SHIPPED | OUTPATIENT
Start: 2025-04-25

## 2025-04-25 RX ORDER — FOLIC ACID 1 MG/1
1 TABLET ORAL DAILY
Qty: 90 TABLET | Refills: 0 | Status: SHIPPED | OUTPATIENT
Start: 2025-04-25

## 2025-04-28 ENCOUNTER — OFFICE VISIT (OUTPATIENT)
Dept: RHEUMATOLOGY | Facility: CLINIC | Age: 65
End: 2025-04-28
Payer: COMMERCIAL

## 2025-04-28 ENCOUNTER — APPOINTMENT (OUTPATIENT)
Dept: RHEUMATOLOGY | Facility: CLINIC | Age: 65
End: 2025-04-28
Payer: COMMERCIAL

## 2025-04-28 VITALS — SYSTOLIC BLOOD PRESSURE: 126 MMHG | DIASTOLIC BLOOD PRESSURE: 70 MMHG | BODY MASS INDEX: 15.55 KG/M2 | WEIGHT: 85 LBS

## 2025-04-28 DIAGNOSIS — M15.9 OSTEOARTHRITIS OF MULTIPLE JOINTS, UNSPECIFIED OSTEOARTHRITIS TYPE: ICD-10-CM

## 2025-04-28 DIAGNOSIS — M05.79 RHEUMATOID ARTHRITIS INVOLVING MULTIPLE SITES WITH POSITIVE RHEUMATOID FACTOR (MULTI): Primary | ICD-10-CM

## 2025-04-28 DIAGNOSIS — M81.0 OSTEOPOROSIS, UNSPECIFIED OSTEOPOROSIS TYPE, UNSPECIFIED PATHOLOGICAL FRACTURE PRESENCE: ICD-10-CM

## 2025-04-28 DIAGNOSIS — Z79.899 LONG-TERM USE OF HIGH-RISK MEDICATION: ICD-10-CM

## 2025-04-28 PROCEDURE — 99214 OFFICE O/P EST MOD 30 MIN: CPT | Performed by: INTERNAL MEDICINE

## 2025-04-28 RX ORDER — ALENDRONATE SODIUM 70 MG/1
TABLET ORAL
Qty: 12 TABLET | Refills: 0 | Status: SHIPPED | OUTPATIENT
Start: 2025-04-28

## 2025-04-28 RX ORDER — METHOTREXATE 2.5 MG/1
TABLET ORAL
Qty: 48 TABLET | Refills: 0 | Status: SHIPPED | OUTPATIENT
Start: 2025-04-28

## 2025-04-28 RX ORDER — FOLIC ACID 1 MG/1
1 TABLET ORAL DAILY
Qty: 90 TABLET | Refills: 0 | Status: SHIPPED | OUTPATIENT
Start: 2025-04-28

## 2025-04-28 NOTE — PROGRESS NOTES
Recheck  RA  /  OP  ( Alendronate )  doing well     HPI - She is doing well.  No pain/swelling.  No AM stiffness.  No CP, resp, or GI.      PE  NAD  RRR no r/m/g  CTA  No edema  No synovitis    A/P - OA and RA, doing well  OP - on alendronate after anabolics - Check DEXA 6/26 for drug holiday  Reviewed prev labs (scanned from [facility])  Check labs  Follow up 3 mo

## 2025-04-29 LAB
ALBUMIN SERPL-MCNC: 4.7 G/DL (ref 3.6–5.1)
ALBUMIN/GLOB SERPL: 1.7 (CALC) (ref 1–2.5)
ALP SERPL-CCNC: 49 U/L (ref 37–153)
ALT SERPL-CCNC: 12 U/L (ref 6–29)
AST SERPL-CCNC: 19 U/L (ref 10–35)
BASOPHILS # BLD AUTO: 8 CELLS/UL (ref 0–200)
BASOPHILS NFR BLD AUTO: 0.2 %
BILIRUB DIRECT SERPL-MCNC: 0.2 MG/DL
BILIRUB INDIRECT SERPL-MCNC: 0.7 MG/DL (CALC) (ref 0.2–1.2)
BILIRUB SERPL-MCNC: 0.9 MG/DL (ref 0.2–1.2)
CREAT SERPL-MCNC: 0.59 MG/DL (ref 0.5–1.05)
CRP SERPL-MCNC: <3 MG/L
EGFRCR SERPLBLD CKD-EPI 2021: 101 ML/MIN/1.73M2
EOSINOPHIL # BLD AUTO: 123 CELLS/UL (ref 15–500)
EOSINOPHIL NFR BLD AUTO: 3 %
ERYTHROCYTE [DISTWIDTH] IN BLOOD BY AUTOMATED COUNT: 13.9 % (ref 11–15)
GLOBULIN SER CALC-MCNC: 2.7 G/DL (CALC) (ref 1.9–3.7)
HCT VFR BLD AUTO: 43.1 % (ref 35–45)
HGB BLD-MCNC: 14.1 G/DL (ref 11.7–15.5)
LYMPHOCYTES # BLD AUTO: 984 CELLS/UL (ref 850–3900)
LYMPHOCYTES NFR BLD AUTO: 24 %
MCH RBC QN AUTO: 27.6 PG (ref 27–33)
MCHC RBC AUTO-ENTMCNC: 32.7 G/DL (ref 32–36)
MCV RBC AUTO: 84.5 FL (ref 80–100)
MONOCYTES # BLD AUTO: 316 CELLS/UL (ref 200–950)
MONOCYTES NFR BLD AUTO: 7.7 %
NEUTROPHILS # BLD AUTO: 2669 CELLS/UL (ref 1500–7800)
NEUTROPHILS NFR BLD AUTO: 65.1 %
PLATELET # BLD AUTO: 263 THOUSAND/UL (ref 140–400)
PMV BLD REES-ECKER: 9.8 FL (ref 7.5–12.5)
PROT SERPL-MCNC: 7.4 G/DL (ref 6.1–8.1)
RBC # BLD AUTO: 5.1 MILLION/UL (ref 3.8–5.1)
WBC # BLD AUTO: 4.1 THOUSAND/UL (ref 3.8–10.8)

## 2025-07-28 ENCOUNTER — OFFICE VISIT (OUTPATIENT)
Dept: RHEUMATOLOGY | Facility: CLINIC | Age: 65
End: 2025-07-28
Payer: COMMERCIAL

## 2025-07-28 VITALS — WEIGHT: 86.7 LBS | BODY MASS INDEX: 15.86 KG/M2 | DIASTOLIC BLOOD PRESSURE: 66 MMHG | SYSTOLIC BLOOD PRESSURE: 120 MMHG

## 2025-07-28 DIAGNOSIS — M15.9 OSTEOARTHRITIS OF MULTIPLE JOINTS, UNSPECIFIED OSTEOARTHRITIS TYPE: ICD-10-CM

## 2025-07-28 DIAGNOSIS — M05.79 RHEUMATOID ARTHRITIS INVOLVING MULTIPLE SITES WITH POSITIVE RHEUMATOID FACTOR (MULTI): Primary | ICD-10-CM

## 2025-07-28 DIAGNOSIS — M81.0 OSTEOPOROSIS, UNSPECIFIED OSTEOPOROSIS TYPE, UNSPECIFIED PATHOLOGICAL FRACTURE PRESENCE: ICD-10-CM

## 2025-07-28 DIAGNOSIS — Z79.899 LONG-TERM USE OF HIGH-RISK MEDICATION: ICD-10-CM

## 2025-07-28 PROCEDURE — 99214 OFFICE O/P EST MOD 30 MIN: CPT | Performed by: INTERNAL MEDICINE

## 2025-07-28 RX ORDER — METHOTREXATE 2.5 MG/1
TABLET ORAL
Qty: 48 TABLET | Refills: 1 | Status: SHIPPED | OUTPATIENT
Start: 2025-07-28

## 2025-07-28 RX ORDER — FOLIC ACID 1 MG/1
1 TABLET ORAL DAILY
Qty: 90 TABLET | Refills: 1 | Status: SHIPPED | OUTPATIENT
Start: 2025-07-28

## 2025-07-28 RX ORDER — ALENDRONATE SODIUM 70 MG/1
TABLET ORAL
Qty: 12 TABLET | Refills: 1 | Status: SHIPPED | OUTPATIENT
Start: 2025-07-28

## 2025-07-28 NOTE — PROGRESS NOTES
Recheck  RA  /  OP  ( alendronate )  doing well    HPI - She is doing well.  No pain/swelling.  AM stiffness ?duration.  No CP, resp, or GI    PE  NAD  RRR no r/m/g  CTA  No edema  No synovitis  NT and no pain with ROM throughout     A/P - OA and RA, doing well  OP - on alendronate after anabolics - check DEXA 6/26 for poss drug holiday  Reviewed prev labs  Check labs now and in 3 mo  Follow up 6 mo or sooner PRN

## 2025-08-28 ENCOUNTER — APPOINTMENT (OUTPATIENT)
Dept: PRIMARY CARE | Facility: CLINIC | Age: 65
End: 2025-08-28
Payer: COMMERCIAL

## 2025-08-28 VITALS
WEIGHT: 89.8 LBS | TEMPERATURE: 97.1 F | RESPIRATION RATE: 20 BRPM | HEART RATE: 69 BPM | SYSTOLIC BLOOD PRESSURE: 112 MMHG | DIASTOLIC BLOOD PRESSURE: 60 MMHG | OXYGEN SATURATION: 99 % | BODY MASS INDEX: 16.42 KG/M2

## 2025-08-28 DIAGNOSIS — Z86.59 HISTORY OF DEPRESSION: ICD-10-CM

## 2025-08-28 DIAGNOSIS — E78.5 HYPERLIPIDEMIA, UNSPECIFIED HYPERLIPIDEMIA TYPE: ICD-10-CM

## 2025-08-28 DIAGNOSIS — Z12.31 BREAST CANCER SCREENING BY MAMMOGRAM: ICD-10-CM

## 2025-08-28 PROCEDURE — 1036F TOBACCO NON-USER: CPT | Performed by: FAMILY MEDICINE

## 2025-08-28 PROCEDURE — 99213 OFFICE O/P EST LOW 20 MIN: CPT | Performed by: FAMILY MEDICINE

## 2025-08-28 RX ORDER — ATORVASTATIN CALCIUM 10 MG/1
10 TABLET, FILM COATED ORAL DAILY
Qty: 90 TABLET | Refills: 3 | Status: SHIPPED | OUTPATIENT
Start: 2025-08-28 | End: 2025-11-26

## 2025-08-28 RX ORDER — FLUOXETINE 20 MG/1
20 TABLET ORAL DAILY
Qty: 30 TABLET | Refills: 11 | Status: SHIPPED | OUTPATIENT
Start: 2025-08-28 | End: 2026-08-28

## 2025-08-28 ASSESSMENT — LIFESTYLE VARIABLES
MEDICAL PROBLEMS AS RESULT OF DRUG USE: NO
DAST10 TOTAL SCORE: 0
NEGLECTED FAMILY OR WORK DUE TO DRUG USE: NO
MISUSED PRESCRIPTION DRUGS: NO
SPOUSE OR PARENTS COMPLAIN ABOUT INVOLVEMENT WITH DRUGS: NO
FEEL BAD ABOUT YOUR DRUG USE: NO
BLACKOUTS OR FLASHBACKS DUE TO DRUG USE: NO
USE MORE THAN ONE DRUG AT A TIME: NO
EXPERIENCED WITHDRAWAL SYMPTOMS DUE TO HEAVY DRUG INTAKE: NO
ABLE TO STOP USING DRUGS WHEN WANT: YES
ENGAGED IN ILLEGAL ACTIVITIES TO OBTAIN DRUGS: NO

## 2025-08-28 ASSESSMENT — PAIN SCALES - GENERAL: PAINLEVEL_OUTOF10: 0-NO PAIN

## 2025-08-28 ASSESSMENT — ENCOUNTER SYMPTOMS
LOSS OF SENSATION IN FEET: 0
OCCASIONAL FEELINGS OF UNSTEADINESS: 0
DEPRESSION: 0

## 2025-08-30 ENCOUNTER — HOSPITAL ENCOUNTER (OUTPATIENT)
Dept: RADIOLOGY | Facility: HOSPITAL | Age: 65
Discharge: HOME | End: 2025-08-30

## 2026-02-02 ENCOUNTER — APPOINTMENT (OUTPATIENT)
Dept: RHEUMATOLOGY | Facility: CLINIC | Age: 66
End: 2026-02-02
Payer: COMMERCIAL